# Patient Record
Sex: MALE | Race: WHITE | NOT HISPANIC OR LATINO | URBAN - METROPOLITAN AREA
[De-identification: names, ages, dates, MRNs, and addresses within clinical notes are randomized per-mention and may not be internally consistent; named-entity substitution may affect disease eponyms.]

---

## 2020-07-07 ENCOUNTER — LAB OUTSIDE AN ENCOUNTER (OUTPATIENT)
Dept: URBAN - METROPOLITAN AREA CLINIC 118 | Facility: CLINIC | Age: 30
End: 2020-07-07

## 2020-07-07 ENCOUNTER — OFFICE VISIT (OUTPATIENT)
Dept: URBAN - METROPOLITAN AREA CLINIC 118 | Facility: CLINIC | Age: 30
End: 2020-07-07
Payer: COMMERCIAL

## 2020-07-07 DIAGNOSIS — K76.9 LIVER DISEASE: ICD-10-CM

## 2020-07-07 DIAGNOSIS — R10.11 RUQ ABDOMINAL PAIN: ICD-10-CM

## 2020-07-07 DIAGNOSIS — K76.89 LIVER FUNCTION ABNORMALITY: ICD-10-CM

## 2020-07-07 PROCEDURE — G9903 PT SCRN TBCO ID AS NON USER: HCPCS | Performed by: INTERNAL MEDICINE

## 2020-07-07 PROCEDURE — 99204 OFFICE O/P NEW MOD 45 MIN: CPT | Performed by: INTERNAL MEDICINE

## 2020-07-07 PROCEDURE — 1036F TOBACCO NON-USER: CPT | Performed by: INTERNAL MEDICINE

## 2020-07-07 PROCEDURE — 80074 ACUTE HEPATITIS PANEL: CPT | Performed by: INTERNAL MEDICINE

## 2020-07-07 NOTE — HPI-TODAY'S VISIT:
pt reports 5 months ruq pain radiating to right back. Reports worst with moving certain ways. Reports no nausea, vomiting , gerd or dysphagia. Reports h/o 5-8 beers per day until recent weeks. Reports 20 lbs intentional weight loss. Pt father  etoh related liver disease. Denies LGI symptoms. pt reports h/o increase lft's. Denies other complaints

## 2020-07-08 LAB
A/G RATIO: 1.9
ALBUMIN: 5.1
ALKALINE PHOSPHATASE: 55
AST (SGOT): 21
BASO (ABSOLUTE): 0
BASOS: 0
BILIRUBIN, TOTAL: 0.7
BUN/CREATININE RATIO: 14
BUN: 13
CALCIUM: 10.6
CARBON DIOXIDE, TOTAL: 26
CHLORIDE: 99
CREATININE: 0.96
EGFR IF AFRICN AM: 122
EGFR IF NONAFRICN AM: 106
EOS (ABSOLUTE): 0
EOS: 0
GLOBULIN, TOTAL: 2.7
GLUCOSE: 99
HBSAG SCREEN: NEGATIVE
HEMATOCRIT: 47.5
HEMATOLOGY COMMENTS:: (no result)
HEMOGLOBIN: 16
HEP A AB, IGM: NEGATIVE
HEP B CORE AB, IGM: NEGATIVE
HEP C VIRUS AB: <0.1
IMMATURE CELLS: (no result)
IMMATURE GRANS (ABS): 0
IMMATURE GRANULOCYTES: 0
LYMPHS (ABSOLUTE): 1.3
LYMPHS: 24
MCH: 31.6
MCHC: 33.7
MCV: 94
MONOCYTES(ABSOLUTE): 0.6
MONOCYTES: 10
NEUTROPHILS (ABSOLUTE): 3.5
NEUTROPHILS: 66
NRBC: (no result)
PLATELETS: 326
POTASSIUM: 4.7
PROTEIN, TOTAL: 7.8
RBC: 5.07
RDW: 11.7
SODIUM: 140
WBC: 5.5

## 2020-07-20 ENCOUNTER — OFFICE VISIT (OUTPATIENT)
Dept: URBAN - METROPOLITAN AREA CLINIC 117 | Facility: CLINIC | Age: 30
End: 2020-07-20

## 2020-08-17 ENCOUNTER — OFFICE VISIT (OUTPATIENT)
Dept: URBAN - METROPOLITAN AREA CLINIC 105 | Facility: CLINIC | Age: 30
End: 2020-08-17

## 2021-03-12 ENCOUNTER — OFFICE VISIT (OUTPATIENT)
Dept: URBAN - METROPOLITAN AREA CLINIC 70 | Facility: CLINIC | Age: 31
End: 2021-03-12

## 2021-05-04 ENCOUNTER — OFFICE VISIT (OUTPATIENT)
Dept: URBAN - METROPOLITAN AREA CLINIC 70 | Facility: CLINIC | Age: 31
End: 2021-05-04

## 2021-05-04 RX ORDER — MELOXICAM 15 MG/1
TABLET ORAL
Qty: 30 UNSPECIFIED | Status: ACTIVE | COMMUNITY

## 2021-05-04 RX ORDER — HYDROCODONE BITARTRATE AND ACETAMINOPHEN 10; 325 MG/1; MG/1
TABLET ORAL
Qty: 21 UNSPECIFIED | Status: ACTIVE | COMMUNITY

## 2021-05-04 RX ORDER — ACETAMINOPHEN AND CODEINE PHOSPHATE 300; 30 MG/1; MG/1
TABLET ORAL
Qty: 21 UNSPECIFIED | Status: ACTIVE | COMMUNITY

## 2021-05-04 RX ORDER — DICLOFENAC SODIUM 75 MG/1
TABLET, DELAYED RELEASE ORAL
Qty: 20 UNSPECIFIED | Status: ACTIVE | COMMUNITY

## 2021-09-22 ENCOUNTER — WEB ENCOUNTER (OUTPATIENT)
Dept: URBAN - METROPOLITAN AREA CLINIC 88 | Facility: CLINIC | Age: 31
End: 2021-09-22

## 2021-09-22 ENCOUNTER — OFFICE VISIT (OUTPATIENT)
Dept: URBAN - METROPOLITAN AREA CLINIC 88 | Facility: CLINIC | Age: 31
End: 2021-09-22
Payer: COMMERCIAL

## 2021-09-22 ENCOUNTER — LAB OUTSIDE AN ENCOUNTER (OUTPATIENT)
Dept: URBAN - METROPOLITAN AREA CLINIC 88 | Facility: CLINIC | Age: 31
End: 2021-09-22

## 2021-09-22 DIAGNOSIS — R10.9 RIGHT FLANK PAIN: ICD-10-CM

## 2021-09-22 DIAGNOSIS — R10.11 RUQ ABDOMINAL PAIN: ICD-10-CM

## 2021-09-22 DIAGNOSIS — R63.4 WEIGHT LOSS: ICD-10-CM

## 2021-09-22 PROCEDURE — 99214 OFFICE O/P EST MOD 30 MIN: CPT | Performed by: INTERNAL MEDICINE

## 2021-09-22 RX ORDER — ACETAMINOPHEN AND CODEINE PHOSPHATE 300; 30 MG/1; MG/1
TABLET ORAL
Qty: 21 UNSPECIFIED | Status: DISCONTINUED | COMMUNITY

## 2021-09-22 RX ORDER — MELOXICAM 15 MG/1
TABLET ORAL
Qty: 30 UNSPECIFIED | Status: DISCONTINUED | COMMUNITY

## 2021-09-22 RX ORDER — DICLOFENAC SODIUM 75 MG/1
TABLET, DELAYED RELEASE ORAL
Qty: 20 UNSPECIFIED | Status: DISCONTINUED | COMMUNITY

## 2021-09-22 RX ORDER — PANTOPRAZOLE SODIUM 40 MG/1
1 TABLET TABLET, DELAYED RELEASE ORAL
Qty: 90 | Refills: 1 | OUTPATIENT
Start: 2021-09-22

## 2021-09-22 RX ORDER — HYDROCODONE BITARTRATE AND ACETAMINOPHEN 10; 325 MG/1; MG/1
TABLET ORAL
Qty: 21 UNSPECIFIED | Status: DISCONTINUED | COMMUNITY

## 2021-09-22 NOTE — HPI-TODAY'S VISIT:
Patient presents today for evaluation of persistent RUQ pain that radiates around to right middle back that has increased over the last year.  Describes it as a pain that is a hurting pain tht is aggravated by eating.  Starts in the mornings and persists throughout the day.  Feels it is worse with sugary food, beer, or acidic foods.  Denies signs of GI blood loss, nausea, vomiting or changes in bowel habits.  Voices weight loss of 30 pounds over the last year.  Has decreased alcohol use (10-15 beers a day) to none.  Also increased exercise and dietary intake.  Experiences constant sensation of hunger.  Denies prior EGD and colonoscopy.  Underwent cholecystectomy in 2020 due to polps noted on imaging.  Concerned about underlying pancreatitis given hx of this in father.  However, admits hx of alcoholism in father.  Last CT A/P was over a year ago with normal findings.  Last labs 1-2 months by his PCP were normal.

## 2021-09-22 NOTE — HPI-OTHER HISTORIES
--Last office note from 2020 by Dr. Maxi Garland: pt reports 5 months ruq pain radiating to right back. Reports worst with moving certain ways. Reports no nausea, vomiting , gerd or dysphagia. Reports h/o 5-8 beers per day until recent weeks. Reports 20 lbs intentional weight loss. Pt father  etoh related liver disease. Denies LGI symptoms. pt reports h/o increase lft's. Denies other complaints

## 2021-11-08 ENCOUNTER — OFFICE VISIT (OUTPATIENT)
Dept: URBAN - METROPOLITAN AREA CLINIC 70 | Facility: CLINIC | Age: 31
End: 2021-11-08
Payer: COMMERCIAL

## 2021-11-08 ENCOUNTER — LAB OUTSIDE AN ENCOUNTER (OUTPATIENT)
Dept: URBAN - METROPOLITAN AREA CLINIC 70 | Facility: CLINIC | Age: 31
End: 2021-11-08

## 2021-11-08 VITALS
SYSTOLIC BLOOD PRESSURE: 124 MMHG | WEIGHT: 174 LBS | TEMPERATURE: 98.1 F | DIASTOLIC BLOOD PRESSURE: 78 MMHG | BODY MASS INDEX: 24.36 KG/M2 | HEIGHT: 71 IN | HEART RATE: 62 BPM

## 2021-11-08 DIAGNOSIS — R10.13 EPIGASTRIC ABDOMINAL PAIN: ICD-10-CM

## 2021-11-08 PROCEDURE — 99213 OFFICE O/P EST LOW 20 MIN: CPT | Performed by: NURSE PRACTITIONER

## 2021-11-08 RX ORDER — DICYCLOMINE HYDROCHLORIDE 20 MG/1
1 CAPSULE TABLET ORAL
Qty: 60 | Refills: 1 | OUTPATIENT
Start: 2021-11-08 | End: 2022-01-07

## 2021-11-08 RX ORDER — PANTOPRAZOLE SODIUM 40 MG/1
1 TABLET TABLET, DELAYED RELEASE ORAL
Qty: 90 | Refills: 1 | Status: ACTIVE | COMMUNITY
Start: 2021-09-22

## 2021-11-08 RX ORDER — PANTOPRAZOLE SODIUM 40 MG/1
1 TABLET TABLET, DELAYED RELEASE ORAL
OUTPATIENT
Start: 2021-09-22

## 2021-11-08 NOTE — HPI-OTHER HISTORIES
----Last office note 09/22/2021: Patient presents today for evaluation of persistent RUQ pain that radiates around to right middle back that has increased over the last year.  Describes it as a pain that is a hurting pain tht is aggravated by eating.  Starts in the mornings and persists throughout the day.  Feels it is worse with sugary food, beer, or acidic foods.  Denies signs of GI blood loss, nausea, vomiting or changes in bowel habits.  Voices weight loss of 30 pounds over the last year.  Has decreased alcohol use (10-15 beers a day) to none.  Also increased exercise and dietary intake.  Experiences constant sensation of hunger.  Denies prior EGD and colonoscopy.  Underwent cholecystectomy in 2020 due to polps noted on imaging.  Concerned about underlying pancreatitis given hx of this in father.  However, admits hx of alcoholism in father.  Last CT A/P was over a year ago with normal findings.  Last labs 1-2 months by his PCP were normal.

## 2021-11-08 NOTE — HPI-TODAY'S VISIT:
Patient presents today for follow up in regards to abdominal pain.  Continues to epigastric pain on daily basis.  He remains on pantoprazole daily but has not noticed significant improvement in pain.  Denies nausea/vomiting, signs of GI blood loss.  Feels pain can be more problematic with intake of alcohol and caffinated beverages.  Does admit to increase stress related to job.  CT A/P from 10/15/2021 was normal.  States tried Carafate in past wtih little improvement.  Has not undergone prior EGD.

## 2021-11-12 ENCOUNTER — OFFICE VISIT (OUTPATIENT)
Dept: URBAN - METROPOLITAN AREA SURGERY CENTER 24 | Facility: SURGERY CENTER | Age: 31
End: 2021-11-12

## 2021-11-19 ENCOUNTER — OFFICE VISIT (OUTPATIENT)
Dept: URBAN - METROPOLITAN AREA SURGERY CENTER 24 | Facility: SURGERY CENTER | Age: 31
End: 2021-11-19
Payer: COMMERCIAL

## 2021-11-19 ENCOUNTER — TELEPHONE ENCOUNTER (OUTPATIENT)
Dept: URBAN - METROPOLITAN AREA CLINIC 92 | Facility: CLINIC | Age: 31
End: 2021-11-19

## 2021-11-19 DIAGNOSIS — K29.60 ADENOPAPILLOMATOSIS GASTRICA: ICD-10-CM

## 2021-11-19 PROCEDURE — 43239 EGD BIOPSY SINGLE/MULTIPLE: CPT | Performed by: INTERNAL MEDICINE

## 2021-11-19 PROCEDURE — G8907 PT DOC NO EVENTS ON DISCHARG: HCPCS | Performed by: INTERNAL MEDICINE

## 2021-11-19 RX ORDER — PANTOPRAZOLE SODIUM 40 MG/1
1 TABLET TABLET, DELAYED RELEASE ORAL
Status: ACTIVE | COMMUNITY
Start: 2021-09-22

## 2021-11-19 RX ORDER — DICYCLOMINE HYDROCHLORIDE 20 MG/1
1 CAPSULE TABLET ORAL
Qty: 60 | Refills: 1 | Status: ACTIVE | COMMUNITY
Start: 2021-11-08 | End: 2022-01-07

## 2021-11-19 RX ORDER — SUCRALFATE 1 G
1 TABLET ON AN EMPTY STOMACH TABLET ORAL TWICE A DAY
Qty: 60 | Refills: 2 | OUTPATIENT

## 2021-12-08 ENCOUNTER — OFFICE VISIT (OUTPATIENT)
Dept: URBAN - METROPOLITAN AREA SURGERY CENTER 24 | Facility: SURGERY CENTER | Age: 31
End: 2021-12-08

## 2022-03-17 ENCOUNTER — LAB OUTSIDE AN ENCOUNTER (OUTPATIENT)
Dept: URBAN - METROPOLITAN AREA CLINIC 70 | Facility: CLINIC | Age: 32
End: 2022-03-17

## 2022-03-17 ENCOUNTER — OFFICE VISIT (OUTPATIENT)
Dept: URBAN - METROPOLITAN AREA CLINIC 70 | Facility: CLINIC | Age: 32
End: 2022-03-17
Payer: COMMERCIAL

## 2022-03-17 DIAGNOSIS — R10.13 EPIGASTRIC PAIN: ICD-10-CM

## 2022-03-17 DIAGNOSIS — K21.9 GERD WITHOUT ESOPHAGITIS: ICD-10-CM

## 2022-03-17 PROBLEM — 3696007: Status: ACTIVE | Noted: 2022-03-17

## 2022-03-17 PROCEDURE — 99213 OFFICE O/P EST LOW 20 MIN: CPT | Performed by: NURSE PRACTITIONER

## 2022-03-17 RX ORDER — NORTRIPTYLINE HYDROCHLORIDE 10 MG/1
1 CAPSULE CAPSULE ORAL QHS
Qty: 30 | Refills: 1 | OUTPATIENT
Start: 2022-03-17

## 2022-03-17 RX ORDER — PANTOPRAZOLE SODIUM 40 MG/1
1 TABLET TABLET, DELAYED RELEASE ORAL
Status: ACTIVE | COMMUNITY
Start: 2021-09-22

## 2022-03-17 RX ORDER — SUCRALFATE 1 G
1 TABLET ON AN EMPTY STOMACH TABLET ORAL TWICE A DAY
Qty: 60 | Refills: 2 | Status: DISCONTINUED | COMMUNITY

## 2022-03-17 RX ORDER — PANTOPRAZOLE SODIUM 40 MG/1
1 TABLET TABLET, DELAYED RELEASE ORAL
OUTPATIENT
Start: 2021-09-22

## 2022-03-17 NOTE — HPI-TODAY'S VISIT:
Patient presents today with persistent pain to epigastric region that occurs primarily after eating.  This may lead to nausea but denies vomiting.  Underwent EGD on 11/19/2021:  Normal esophagus, non-bleeding gastric erosions (neg HP), normal duodenum.  Currently on pantoprazole daily w/o improvement in complaints voiced.

## 2022-03-17 NOTE — HPI-OTHER HISTORIES
---------------------- Last office note 11/08/2021: Patient presents today for follow up in regards to abdominal pain.  Continues to epigastric pain on daily basis.  He remains on pantoprazole daily but has not noticed significant improvement in pain.  Denies nausea/vomiting, signs of GI blood loss.  Feels pain can be more problematic with intake of alcohol and caffinated beverages.  Does admit to increase stress related to job.  CT A/P from 10/15/2021 was normal.  States tried Carafate in past wtih little improvement.  Has not undergone prior EGD.

## 2022-03-29 PROBLEM — 266435005: Status: ACTIVE | Noted: 2022-03-29

## 2022-04-13 LAB
AMYLASE: 60
LIPASE: 38

## 2022-04-18 ENCOUNTER — DASHBOARD ENCOUNTERS (OUTPATIENT)
Age: 32
End: 2022-04-18

## 2022-04-21 ENCOUNTER — OFFICE VISIT (OUTPATIENT)
Dept: URBAN - METROPOLITAN AREA CLINIC 70 | Facility: CLINIC | Age: 32
End: 2022-04-21

## 2022-04-21 RX ORDER — PANTOPRAZOLE SODIUM 40 MG/1
1 TABLET TABLET, DELAYED RELEASE ORAL
Status: ACTIVE | COMMUNITY
Start: 2021-09-22

## 2022-04-21 RX ORDER — NORTRIPTYLINE HYDROCHLORIDE 10 MG/1
1 CAPSULE CAPSULE ORAL QHS
Qty: 30 | Refills: 1 | Status: ACTIVE | COMMUNITY
Start: 2022-03-17